# Patient Record
Sex: MALE | Race: OTHER | NOT HISPANIC OR LATINO | URBAN - METROPOLITAN AREA
[De-identification: names, ages, dates, MRNs, and addresses within clinical notes are randomized per-mention and may not be internally consistent; named-entity substitution may affect disease eponyms.]

---

## 2024-11-30 ENCOUNTER — OFFICE VISIT (OUTPATIENT)
Dept: URGENT CARE | Age: 19
End: 2024-11-30
Payer: COMMERCIAL

## 2024-11-30 VITALS
TEMPERATURE: 98.3 F | HEART RATE: 84 BPM | SYSTOLIC BLOOD PRESSURE: 135 MMHG | DIASTOLIC BLOOD PRESSURE: 76 MMHG | OXYGEN SATURATION: 98 %

## 2024-11-30 DIAGNOSIS — L03.012 PARONYCHIA OF FINGER OF LEFT HAND: Primary | ICD-10-CM

## 2024-11-30 DIAGNOSIS — L03.012 CELLULITIS OF FINGER OF LEFT HAND: ICD-10-CM

## 2024-11-30 RX ORDER — SULFAMETHOXAZOLE AND TRIMETHOPRIM 800; 160 MG/1; MG/1
1 TABLET ORAL 2 TIMES DAILY
Qty: 10 TABLET | Refills: 0 | Status: SHIPPED | OUTPATIENT
Start: 2024-11-30 | End: 2024-12-05

## 2024-11-30 RX ORDER — CEPHALEXIN 500 MG/1
500 CAPSULE ORAL 2 TIMES DAILY
Qty: 10 CAPSULE | Refills: 0 | Status: SHIPPED | OUTPATIENT
Start: 2024-11-30 | End: 2024-12-05

## 2024-11-30 RX ORDER — CEPHALEXIN 500 MG/1
500 CAPSULE ORAL 3 TIMES DAILY
Qty: 15 CAPSULE | Refills: 0 | Status: SHIPPED | OUTPATIENT
Start: 2024-11-30 | End: 2024-11-30 | Stop reason: ENTERED-IN-ERROR

## 2024-11-30 NOTE — PATIENT INSTRUCTIONS
You have a paronychia of the left middle finger. This is an infection in the edge of the nail bed.  Please increase your oral fluids for the next 7-10 days  Please take Keflex and Bactrim DS as prescribed  I recommend use of a probiotic while taking the antibiotics and for an additional 7-10 days after completing treatment. Please ask the pharmacist for advice about an appropriate product for this purpose.  Soak finger in 50-50 mixture of hydrogen peroxide and warm water as comfortably as you can tolerate for 10 minutes 3-4 times daily for the next 3-4 days.  May apply light dressing and Neosporin ointment to wound area. Please change whenever wet or soiled or at least once every 24 hours.  If no improvement in 48 hours please follow-up for further evaluation of this problem.  If fever greater than 102 degrees F, chills, nausea, vomiting, increased redness, increased pain, induration, drainage, crusting, purulent discharge please go to emergency department for further evaluation of this problem  This note was generated by voice recognition software. Minor transcription/grammatical errors may be present. Please call for clarification.

## 2024-12-07 PROBLEM — S05.91XA: Status: ACTIVE | Noted: 2024-12-07

## 2024-12-07 PROBLEM — S02.31XA FRACTURE OF RIGHT ORBITAL FLOOR (MULTI): Status: ACTIVE | Noted: 2024-12-07

## 2024-12-07 PROBLEM — S06.0X0A CONCUSSION WITHOUT LOSS OF CONSCIOUSNESS: Status: ACTIVE | Noted: 2018-10-30

## 2024-12-07 PROBLEM — S62.306A: Status: ACTIVE | Noted: 2019-10-07

## 2024-12-07 ASSESSMENT — ENCOUNTER SYMPTOMS
COUGH: 0
PALPITATIONS: 0
WOUND: 1
CHEST TIGHTNESS: 0
DYSURIA: 0
FEVER: 0
CONSTIPATION: 0
CHILLS: 0
HEADACHES: 0
SORE THROAT: 0
SHORTNESS OF BREATH: 0
RHINORRHEA: 0
NAUSEA: 0
VOMITING: 0
FREQUENCY: 0
COLOR CHANGE: 1
DIARRHEA: 0
WHEEZING: 0

## 2024-12-07 NOTE — PROGRESS NOTES
Subjective   Patient ID: Greg Ray is a 18 y.o. male.    18-year-old male presents with infection of the left middle finger.  He states this has worsened significantly in the last 24 hours.  He admits that he does tend to bite his nails and suspicious this may be related to his infection.      History provided by:  Patient   used: No        The following portions of the chart were reviewed this encounter and updated as appropriate:  Allergies  Meds  Problems  Med Hx  Surg Hx  Fam Hx         Review of Systems   Constitutional:  Negative for chills and fever.   HENT:  Negative for congestion, ear discharge, rhinorrhea and sore throat.    Respiratory:  Negative for cough, chest tightness, shortness of breath and wheezing.    Cardiovascular:  Negative for palpitations.   Gastrointestinal:  Negative for constipation, diarrhea, nausea and vomiting.   Genitourinary:  Negative for dysuria and frequency.   Skin:  Positive for color change and wound.   Neurological:  Negative for headaches.     Objective   Physical Exam  Vital signs are reviewed. Alert and oriented x3 with normal mood and affect  Patient is well nourished, well-developed, alert and in no acute distress  No pain to palpation over frontal, ethmoid or maxillary sinus areas    External eyes, orbits, conjunctiva and eyelids are normal in appearance  Pupils are equal, round, reactive to light and accommodation, extraocular movements intact    External ears appear normal  External canals are normal in appearance  Right tympanic membrane is intact and pale gray in appearance  Left tympanic membrane is intact and pale gray in appearance  There is no middle ear effusion noted on the right  There is no middle ear effusion noted on the left  External appearance of the nose is normal  Nasal mucosa, septum, turbinates are dark pink in appearance  There is no nasal discharge in both nares    Oral mucosa is uniformly pink and moist  Palate is  pink, symmetric and intact  Tongue is moist, mobile and midline  Posterior pharynx not erythematous with no concretions or exudates present  No cervical lymphadenopathy palpated    Heart has regular rate and rhythm. No murmurs, rubs or gallops are auscultated at this exam.    Respiratory rate rhythm and effort are normal. Breath sounds bilaterally are clear on auscultation without crackles, rhonchi, wheezes or friction rub.    Abdomen: Normal bowel sounds on auscultation. Soft, nontender without rebound or rigidity on palpation    Extremities: Left middle finger: Patient has large pocket of purulent material at the base of the fingernail.  Appearance is typical for paronychia.  General    Date/Time: 12/7/2024 2:10 PM    Performed by: Kamron Schwartz DO  Authorized by: Kamron Schwartz DO    Consent:     Consent obtained:  Verbal    Consent given by:  Patient    Risks, benefits, and alternatives were discussed: yes      Risks discussed:  Infection, bleeding, incomplete drainage and pain    Alternatives discussed:  No treatment  Universal protocol:     Procedure explained and questions answered to patient or proxy's satisfaction: yes      Relevant documents present and verified: no      Test results available: no      Imaging studies available: no      Required blood products, implants, devices, and special equipment available: no      Site/side marked: no      Immediately prior to procedure, a time out was called: no      Patient identity confirmed:  Verbally with patient  Indications:     Indications:  Painful, indurated paronychia of left middle finger  Pre-procedure details:     Skin preparation:  Povidone-iodine  Sedation:     Sedation type:  None  Anesthesia:     Anesthesia method:  None  Procedure specific details:      Using #11 scapel, made 5mm incision parallel with nail surface of theinto pocket of purulent material under the cuticle with immediate spontaneous return of pale green purulent discharge.  Finger put to soak in 50/50 mixture of warm water and 3% hydrogen peroxide  Post-procedure details:     Procedure completion:  Tolerated well, no immediate complications      Assessment/Plan   Diagnoses and all orders for this visit:  Paronychia of finger of left hand  -     sulfamethoxazole-trimethoprim (Bactrim DS) 800-160 mg tablet; Take 1 tablet by mouth 2 times a day for 5 days.  -     cephalexin (Keflex) 500 mg capsule; Take 1 capsule (500 mg) by mouth 2 times a day for 5 days.  Cellulitis of finger of left hand  -     sulfamethoxazole-trimethoprim (Bactrim DS) 800-160 mg tablet; Take 1 tablet by mouth 2 times a day for 5 days.  -     cephalexin (Keflex) 500 mg capsule; Take 1 capsule (500 mg) by mouth 2 times a day for 5 days.    Patient disposition: Home